# Patient Record
(demographics unavailable — no encounter records)

---

## 2025-04-01 NOTE — PROCEDURE
[FreeTextEntry1] : Ultrasound-guided thyroid FNA [FreeTextEntry2] : Enlarging left lower nodule 2.1 cm, TR 4 [FreeTextEntry3] : Patient for thyroid biopsy. Risks benefits and alternatives discussed including bleeding, infection, swelling and need for repeat biopsy. Questions answered. Consent obtained, timeout taken.  Patient supine. No anesthetic used.  Nodule localized with US guidance Sterile technique with Betadine skin prep. 22-gauge needle under ultrasound guidance with a single pass. Slides prepared sent to cytology.  Post procedure: Hemostasis obtained, patient tolerated well, no complications. Post procedure instructions given.

## 2025-04-01 NOTE — HISTORY OF PRESENT ILLNESS
[de-identified] : Patient referred by Dr. Richmond for evaluation of left thyroid nodule. Patient was noted to have a thyroid nodule on a neck CT scan July 2020. Thyroid ultrasound October thousand 20: Right lobe 4.8 x 1.8 x 1.9 CM, no nodules. Left lobe  4.2 x 1.7 CM  with lower pole nodule, measuring 1.5 x 1 x 1.4 CM. Biopsy November 2020: Hurthle cells, Thyroseq negative.  Patient denies history of thyroid disease, dysphagia, change in voice or radiation exposure.  f/u US 3/2022 with  increase in left nodule 1.7 cm. FNA 5/2022 benign.  f/u US 3/2024 no change.  Follow-up ultrasound March 2025, left lower nodule now 2.1 cm.  No recent blood work. denies recent illness.    I have reviewed all old and new data and available images.

## 2025-04-01 NOTE — PHYSICAL EXAM
[de-identified] : short thick neck, no cervical or supraclavicular adenopathy, trachea midline, thyroid without enlargement or palpable mass [Normal] : no neck adenopathy [de-identified] : Skin:  normal appearance.  no rash, nodules, vesicles, or erythema,\par  Musculoskeletal:  full range of motion and no deformities appreciated\par  Neurological:  grossly intact\par  Psychiatric:  oriented to person, place and time with appropriate affect

## 2025-04-01 NOTE — ASSESSMENT
[FreeTextEntry1] : Patient with thyroid nodule with initial atypical biopsy and negative genetic panel. increased on followup ultrasound  3/2022, repeat biopsy 5/2022 benign, nodule now enlarged on recent ultrasound.  Ultrasound-guided fine-needle aspiration performed in the office today.  The patient will contact to review results.  If cytology is benign, I recommended a repeat ultrasound September 2025, RTO 6 months  I have answered their questions to the best of my ability.